# Patient Record
Sex: FEMALE
[De-identification: names, ages, dates, MRNs, and addresses within clinical notes are randomized per-mention and may not be internally consistent; named-entity substitution may affect disease eponyms.]

---

## 2017-04-04 ENCOUNTER — HOSPITAL ENCOUNTER (EMERGENCY)
Dept: HOSPITAL 31 - C.ER | Age: 52
Discharge: HOME | End: 2017-04-04
Payer: MEDICAID

## 2017-04-04 VITALS — RESPIRATION RATE: 18 BRPM | OXYGEN SATURATION: 100 %

## 2017-04-04 VITALS — HEART RATE: 68 BPM | TEMPERATURE: 98.6 F | SYSTOLIC BLOOD PRESSURE: 160 MMHG | DIASTOLIC BLOOD PRESSURE: 92 MMHG

## 2017-04-04 DIAGNOSIS — R10.12: Primary | ICD-10-CM

## 2017-04-04 LAB
ALBUMIN/GLOB SERPL: 1.3 {RATIO} (ref 1–2.1)
ALP SERPL-CCNC: 67 U/L (ref 38–126)
ALT SERPL-CCNC: 7 U/L (ref 9–52)
AST SERPL-CCNC: 22 U/L (ref 14–36)
BASOPHILS # BLD AUTO: 0.1 K/UL (ref 0–0.2)
BASOPHILS NFR BLD: 1.4 % (ref 0–2)
BILIRUB SERPL-MCNC: 0.5 MG/DL (ref 0.2–1.3)
BILIRUB UR-MCNC: NEGATIVE MG/DL
BUN SERPL-MCNC: 17 MG/DL (ref 7–17)
CALCIUM SERPL-MCNC: 9.2 MG/DL (ref 8.6–10.4)
CHLORIDE SERPL-SCNC: 100 MMOL/L (ref 98–107)
CO2 SERPL-SCNC: 23 MMOL/L (ref 22–30)
EOSINOPHIL # BLD AUTO: 0.2 K/UL (ref 0–0.7)
EOSINOPHIL NFR BLD: 3.1 % (ref 0–4)
ERYTHROCYTE [DISTWIDTH] IN BLOOD BY AUTOMATED COUNT: 14.5 % (ref 11.5–14.5)
GLOBULIN SER-MCNC: 3.4 GM/DL (ref 2.2–3.9)
GLUCOSE SERPL-MCNC: 76 MG/DL (ref 65–105)
GLUCOSE UR STRIP-MCNC: NORMAL MG/DL
HCT VFR BLD CALC: 41.4 % (ref 34–47)
KETONES UR STRIP-MCNC: NEGATIVE MG/DL
LEUKOCYTE ESTERASE UR-ACNC: (no result) LEU/UL
LYMPHOCYTES # BLD AUTO: 1.8 K/UL (ref 1–4.3)
LYMPHOCYTES NFR BLD AUTO: 24.3 % (ref 20–40)
MCH RBC QN AUTO: 26.9 PG (ref 27–31)
MCHC RBC AUTO-ENTMCNC: 32.6 G/DL (ref 33–37)
MCV RBC AUTO: 82.6 FL (ref 81–99)
MONOCYTES # BLD: 0.6 K/UL (ref 0–0.8)
MONOCYTES NFR BLD: 8 % (ref 0–10)
NRBC BLD AUTO-RTO: 0.1 % (ref 0–2)
PH UR STRIP: 5 [PH] (ref 5–8)
PLATELET # BLD: 192 K/UL (ref 130–400)
PMV BLD AUTO: 10.5 FL (ref 7.2–11.7)
POTASSIUM SERPL-SCNC: 4 MMOL/L (ref 3.6–5.2)
PROT SERPL-MCNC: 7.7 G/DL (ref 6.3–8.3)
PROT UR STRIP-MCNC: NEGATIVE MG/DL
RBC # UR STRIP: (no result) /UL
RBC #/AREA URNS HPF: 9 /HPF (ref 0–3)
SODIUM SERPL-SCNC: 139 MMOL/L (ref 132–148)
SP GR UR STRIP: 1.03 (ref 1–1.03)
UROBILINOGEN UR-MCNC: NORMAL MG/DL (ref 0.2–1)
WBC # BLD AUTO: 7.5 K/UL (ref 4.8–10.8)
WBC #/AREA URNS HPF: 1 /HPF (ref 0–5)

## 2017-04-04 PROCEDURE — 93005 ELECTROCARDIOGRAM TRACING: CPT

## 2017-04-04 PROCEDURE — 99285 EMERGENCY DEPT VISIT HI MDM: CPT

## 2017-04-04 PROCEDURE — 80053 COMPREHEN METABOLIC PANEL: CPT

## 2017-04-04 PROCEDURE — 83880 ASSAY OF NATRIURETIC PEPTIDE: CPT

## 2017-04-04 PROCEDURE — 96361 HYDRATE IV INFUSION ADD-ON: CPT

## 2017-04-04 PROCEDURE — 96374 THER/PROPH/DIAG INJ IV PUSH: CPT

## 2017-04-04 PROCEDURE — 81001 URINALYSIS AUTO W/SCOPE: CPT

## 2017-04-04 PROCEDURE — 84484 ASSAY OF TROPONIN QUANT: CPT

## 2017-04-04 PROCEDURE — 85025 COMPLETE CBC W/AUTO DIFF WBC: CPT

## 2017-04-04 PROCEDURE — 74022 RADEX COMPL AQT ABD SERIES: CPT

## 2017-04-04 NOTE — C.PDOC
History Of Present Illness


51 year old female presents to the ED with complaints of cramping epigastric 

discomfort for the past 1 day. Patient has a history of constipation and was 

seen in 2016 for the same complaints. Denies vomiting, diarrhea, fever, 

chills, or any other complaints at this time.


Time Seen by Provider: 17 17:43


Chief Complaint (Nursing): Chest Pain


History Per: Patient


History/Exam Limitations: no limitations


Onset/Duration Of Symptoms: Days


Current Symptoms Are (Timing): Still Present


Severity: Mild


Location Of Pain/Discomfort: Epigastric


Quality Of Discomfort: Cramping


Associated Symptoms: denies: Fever, Chills, Vomiting, Diarrhea





Past Medical History


Reviewed: Historical Data, Nursing Documentation, Vital Signs


Vital Signs: 


 Last Vital Signs











Temp  98.6 F   17 20:47


 


Pulse  68   17 20:47


 


Resp  18   17 20:47


 


BP  160/92 H  17 20:47


 


Pulse Ox  100   17 20:47














- Medical History


PMH: Depression (sometimes), Gastritis, HTN, Hypercholesterolemia, Migraine


Family History: States: Unknown Family Hx





- Social History


Hx Tobacco Use: No


Hx Alcohol Use: No


Hx Substance Use: No





- Immunization History


Hx Tetanus Toxoid Vaccination: Yes


Hx Influenza Vaccination: No


Hx Pneumococcal Vaccination: No





Review Of Systems


Except As Marked, All Systems Reviewed And Found Negative.


Constitutional: Negative for: Fever, Chills


Gastrointestinal: Positive for: Abdominal Pain.  Negative for: Nausea, Vomiting

, Diarrhea





Physical Exam





- Physical Exam


Appears: Non-toxic, No Acute Distress


Skin: Normal Color, Warm, Dry


Head: Atraumatic, Normacephalic


Eye(s): bilateral: Normal Inspection


Oral Mucosa: Moist


Chest: Symmetrical, No Deformity


Cardiovascular: Rhythm Regular


Respiratory: Normal Breath Sounds, No Accessory Muscle Use


Gastrointestinal/Abdominal: Soft, No Tenderness, No Distention, No Guarding, No 

Rebound, Other (+Obese abdomen)


Extremity: Normal ROM


Neurological/Psych: Oriented x3, Normal Speech, Normal Cognition





ED Course And Treatment





- Laboratory Results


Result Diagrams: 


 17 18:11





 17 18:11


Lab Interpretation: Normal (trop neg.)


Urine Pregnancy POC: Negative


ECG: Interpreted By Me


ECG Rhythm: Sinus Rhythm


ECG Interpretation: Normal


Rate From EC


O2 Sat by Pulse Oximetry: 100 (Room air)


Pulse Ox Interpretation: Normal





- Radiology


CXR: Interpreted by Me


CXR Interpretation: Yes: No Acute Disease





- Other Rad


  ** abd x 2


X-Ray: Interpreted by Me (+FOS)


Progress Note: Obstructive series, EKG, Blood work, and Urinalysis ordered and 

reviewed.  Patient treated with Aspirin, Toradol, and IV fluids.





Medical Decision Making


Medical Decision Making: 


recurrent constipation, no GB/cardiac issues today


Again diet and exercise educated.





Disposition


Doctor Will See Patient In The: Office


Counseled Patient/Family Regarding: Studies Performed, Diagnosis





- Disposition


Referrals: 


[facility] [Outside]


Disposition: HOME/ ROUTINE


Disposition Time: 20:24


Condition: GOOD


Additional Instructions: 


tavo un purgante (Citrato de Magnesio) y re-evalua garber molestia del abdomen 

despues de usar el angelina 2-3 veces


Sigue un esuavesante de los heces diario


Come mas verduras crudas


Tavo mas agua.l


Sigue en la [facility] necessario. 


Instructions:  Constipation (ED)


Print Language: Albanian





- Clinical Impression


Clinical Impression: 


 Colicky LUQ abdominal pain





- Scribe Statement


The provider has reviewed the documentation as recorded by the Scribe


Kamron Vazquez.





Provider Attestation: 





All medical record entries made by the Scribe were at my direction and 

personally dictated by me. I have reviewed the chart and agree that the record 

accurately reflects my personal performance of the history, physical exam, 

medical decision making, and the department course for this patient. I have 

also personally directed, reviewed, and agree with the discharge instructions 

and disposition.

## 2017-04-05 NOTE — RAD
PROCEDURE:  Radiographs of the chest and abdomen (obstructive series)



HISTORY:

epigastric pain  



COMPARISON:

No prior.



TECHNIQUE:

AP radiograph of the chest, with upright and supine radiographs of 

the abdomen.



FINDINGS:



CHEST:

Lungs: Prominent lung markings. No focal infiltrate or consolidation.



Cardiovascular: Normal size heart. No pulmonary vascular congestion.



Pleura: No pleural fluid. No pneumothorax.



Other findings: None.



ABDOMEN AND PELVIS:

Bowel: Mild-to-moderate constipation.  Otherwise unremarkable bowel 

gas pattern. . No evidence of mechanical obstruction.



Free air: None.



Bones:  Unremarkable.



Other findings: None.



IMPRESSION:

Mild-to-moderate constipation. No radiographic evidence of SBO.



Slightly prominent lung markings.  No evidence of subdiaphragmatic 

free air.

## 2017-04-05 NOTE — CARD
--------------- APPROVED REPORT --------------





EKG Measurement

Heart [id number]

MT 152P56

[id number]

[id number]

[id number]



<Conclusion>

Normal sinus rhythm

Normal ECG

## 2017-07-27 ENCOUNTER — HOSPITAL ENCOUNTER (OUTPATIENT)
Dept: HOSPITAL 31 - C.ER | Age: 52
Setting detail: OBSERVATION
LOS: 1 days | Discharge: HOME | End: 2017-07-28
Attending: INTERNAL MEDICINE | Admitting: INTERNAL MEDICINE
Payer: MEDICAID

## 2017-07-27 DIAGNOSIS — I45.10: ICD-10-CM

## 2017-07-27 DIAGNOSIS — R07.9: Primary | ICD-10-CM

## 2017-07-27 DIAGNOSIS — E78.5: ICD-10-CM

## 2017-07-27 DIAGNOSIS — Z87.891: ICD-10-CM

## 2017-07-27 DIAGNOSIS — I10: ICD-10-CM

## 2017-07-27 DIAGNOSIS — K59.00: ICD-10-CM

## 2017-07-27 LAB
ALBUMIN/GLOB SERPL: 1.1 {RATIO} (ref 1–2.1)
ALP SERPL-CCNC: 82 U/L (ref 38–126)
ALT SERPL-CCNC: 19 U/L (ref 9–52)
AST SERPL-CCNC: 18 U/L (ref 14–36)
BASOPHILS # BLD AUTO: 0.1 K/UL (ref 0–0.2)
BASOPHILS NFR BLD: 1.1 % (ref 0–2)
BILIRUB SERPL-MCNC: 0.7 MG/DL (ref 0.2–1.3)
BUN SERPL-MCNC: 11 MG/DL (ref 7–17)
CALCIUM SERPL-MCNC: 8.4 MG/DL (ref 8.6–10.4)
CHLORIDE SERPL-SCNC: 101 MMOL/L (ref 98–107)
CO2 SERPL-SCNC: 26 MMOL/L (ref 22–30)
EOSINOPHIL # BLD AUTO: 0.2 K/UL (ref 0–0.7)
EOSINOPHIL NFR BLD: 2.5 % (ref 0–4)
ERYTHROCYTE [DISTWIDTH] IN BLOOD BY AUTOMATED COUNT: 13.3 % (ref 11.5–14.5)
GLOBULIN SER-MCNC: 3.2 GM/DL (ref 2.2–3.9)
GLUCOSE SERPL-MCNC: 88 MG/DL (ref 65–105)
HCT VFR BLD CALC: 38.1 % (ref 34–47)
LYMPHOCYTES # BLD AUTO: 1.7 K/UL (ref 1–4.3)
LYMPHOCYTES NFR BLD AUTO: 27.1 % (ref 20–40)
MCH RBC QN AUTO: 26.2 PG (ref 27–31)
MCHC RBC AUTO-ENTMCNC: 32 G/DL (ref 33–37)
MCV RBC AUTO: 82.1 FL (ref 81–99)
MONOCYTES # BLD: 0.5 K/UL (ref 0–0.8)
MONOCYTES NFR BLD: 7.2 % (ref 0–10)
NRBC BLD AUTO-RTO: 0.1 % (ref 0–2)
PLATELET # BLD: 181 K/UL (ref 130–400)
PMV BLD AUTO: 10.6 FL (ref 7.2–11.7)
POTASSIUM SERPL-SCNC: 4.2 MMOL/L (ref 3.6–5.2)
PROT SERPL-MCNC: 6.8 G/DL (ref 6.3–8.3)
SODIUM SERPL-SCNC: 139 MMOL/L (ref 132–148)
WBC # BLD AUTO: 6.3 K/UL (ref 4.8–10.8)

## 2017-07-27 PROCEDURE — 80053 COMPREHEN METABOLIC PANEL: CPT

## 2017-07-27 PROCEDURE — 84443 ASSAY THYROID STIM HORMONE: CPT

## 2017-07-27 PROCEDURE — 36415 COLL VENOUS BLD VENIPUNCTURE: CPT

## 2017-07-27 PROCEDURE — 71010: CPT

## 2017-07-27 PROCEDURE — 83036 HEMOGLOBIN GLYCOSYLATED A1C: CPT

## 2017-07-27 PROCEDURE — 85025 COMPLETE CBC W/AUTO DIFF WBC: CPT

## 2017-07-27 PROCEDURE — 96372 THER/PROPH/DIAG INJ SC/IM: CPT

## 2017-07-27 PROCEDURE — 84484 ASSAY OF TROPONIN QUANT: CPT

## 2017-07-27 PROCEDURE — 99285 EMERGENCY DEPT VISIT HI MDM: CPT

## 2017-07-27 PROCEDURE — 80061 LIPID PANEL: CPT

## 2017-07-27 RX ADMIN — ENOXAPARIN SODIUM SCH MG: 40 INJECTION SUBCUTANEOUS at 18:30

## 2017-07-27 RX ADMIN — PANTOPRAZOLE SODIUM SCH MG: 40 TABLET, DELAYED RELEASE ORAL at 18:30

## 2017-07-27 NOTE — CP.PCM.PN
Subjective





- Date & Time of Evaluation


Date of Evaluation: 17


Time of Evaluation: 16:45





- Subjective


Subjective: 





PGY3 Medicine Note - Dr. Zamora's service:


Patient seen and examined at bedside in ED.  Patient is a 51 year old female 

with PMHx of hyperlipidemia, hypertension, migraines, gastritis and a stress 

ulcer.  Patient denies having hypertension and hyperlipidemia but she has had 

it on prior ER visits.  Patient is presenting for chest pain this morning.  

Patient says she was cooking and started feeling very hot around 11am.  Then 

she got dizzy and began experiencing substernal, sudden, sharp chest pain.  The 

pain did not radiate.  It was associated with nausea, shortness of breath and 

diaphoresis.  Patient says this is the first time she has ever had chest pain 

however she was admitted in  for chest pain.  Patient says the pain is gone 

now but it lasted for about 3 hours.  Patient reports headache now associated 

with right neck pain.  Patient has had headaches like this before.  Patient 

says it started 3 hours ago.  Patient also reports intermittent constipation.  

Patient denies fever, chills, cough, vomiting, diarrhea, dysuria.





PMHx: as above:


PSHx: C section


Family Hx: patient says he brother  suddenly at 11 years old from a heart 

problem


Social Hx: quit smoking 3 years ago before which she smoked 5 cigarettes a day, 

denies ETOH, denies illicit drug use


Allergies: NKDA





Objective





- Vital Signs/Intake and Output


Vital Signs (last 24 hours): 


 











Temp Pulse Resp BP Pulse Ox


 


 98 F   55 L  14   148/67   100 


 


 17 14:12  17 16:52  17 16:52  17 16:52  17 16:52











- Labs


Labs: 


 





 17 15:07 





 17 15:07 











- Constitutional


Appears: Non-toxic, No Acute Distress





- Head Exam


Head Exam: NORMAL INSPECTION





- Eye Exam


Eye Exam: EOMI





- ENT Exam


ENT Exam: Mucous Membranes Moist





- Respiratory Exam


Respiratory Exam: Decreased Breath Sounds, Clear to Ausculation Bilateral, 

NORMAL BREATHING PATTERN.  absent: Rales, Rhonchi, Wheezes





- Cardiovascular Exam


Cardiovascular Exam: REGULAR RHYTHM, +S1, +S2.  absent: Gallop, Rubs, Murmur





- GI/Abdominal Exam


GI & Abdominal Exam: Soft, Normal Bowel Sounds.  absent: Distended, Firm, 

Tenderness





- Extremities Exam


Extremities Exam: absent: Pedal Edema, Tenderness





- Neurological Exam


Neurological Exam: Alert, Awake, Oriented x3





- Psychiatric Exam


Psychiatric exam: Normal Affect, Normal Mood





- Skin


Skin Exam: Normal Color, Warm





Assessment and Plan





- Assessment and Plan (Free Text)


Assessment: 





Chest Pain


MAXIMILIANO negative x1


EKG - bradycardic at 52bpm with incomplete RBBB


CXR - no consolidation, pleural effusion or definite pneumothorax (please see 

full report)


F/U ROMIs with EKGs x2 at 21:30 and 4am


F/U HgbA1C, TSH, Lipid panel


ASA 81mg PO daily


Crestor 10mg PO HS


Nitro SL Q5M





Headache


Tylenol 650mg PO Q6H PRN headache





HTN


Started lisinopril 2.5mg PO daily





Prophylaxis


Protonix 40mg PO daily


Lovenox 40mg SC daily





All management per Dr. Zamora

## 2017-07-27 NOTE — C.PDOC
History Of Present Illness





52 y/o female, with no significant past medical history, presents to emergency 

department via BLS with complaint of sudden onset mid-sternal chest discomfort, 

described as sharp, which eased up and then lingered, lasting about 2 hours. 

Patient states she began to feel short of breath and sweaty, causing concern, 

and prompting EMS call. Patient states she has never had similar symptoms in 

the past. She reports chest pain is now resolved, but now complains of 

headache. Denies family cardiac history or smoking. Otherwise, denies fever, 

chills, nausea, vomiting, or other associated symptoms.


Time Seen by Provider: 07/27/17 14:22


Chief Complaint (Nursing): Chest Pain


History Per: Patient


History/Exam Limitations: no limitations


Onset/Duration Of Symptoms: Hrs


Current Symptoms Are (Timing): Gone


Quality: "Pain"


Associated Symptoms: Dyspnea, Diaphoresis


Recent travel outside of the [place]: No





Past Medical History


Reviewed: Historical Data, Nursing Documentation, Vital Signs


Vital Signs: 


 Last Vital Signs











Temp  98 F   07/27/17 14:12


 


Pulse  55 L  07/27/17 16:52


 


Resp  14   07/27/17 16:52


 


BP  148/67   07/27/17 16:52


 


Pulse Ox  100   07/27/17 16:52














- Medical History


PMH: Depression (sometimes), Gastritis, HTN, Hypercholesterolemia, Migraine


Family History: States: Unknown Family Hx





- Social History


Hx Tobacco Use: No


Hx Alcohol Use: No


Hx Substance Use: No





- Immunization History


Hx Tetanus Toxoid Vaccination: Yes


Hx Influenza Vaccination: No


Hx Pneumococcal Vaccination: No





Review Of Systems


Except As Marked, All Systems Reviewed And Found Negative.


Constitutional: Positive for: Sweats.  Negative for: Fever, Chills


Cardiovascular: Positive for: Chest Pain.  Negative for: Palpitations


Respiratory: Positive for: Shortness of Breath.  Negative for: Cough


Gastrointestinal: Negative for: Nausea, Vomiting


Skin: Negative for: Rash


Neurological: Positive for: Headache.  Negative for: Dizziness





Physical Exam





- Physical Exam


Appears: Non-toxic, No Acute Distress


Skin: Warm, Dry


Head: Atraumatic, Normacephalic


Oral Mucosa: Moist


Chest: Symmetrical


Cardiovascular: Rhythm Regular


Respiratory: Normal Breath Sounds, No Rales, No Rhonchi, No Wheezing


Gastrointestinal/Abdominal: Soft, No Tenderness, No Guarding, No Rebound


Back: Normal Inspection


Extremity: Normal ROM, Capillary Refill (< 2 sec.  )


Neurological/Psych: Oriented x3, Normal Speech, Normal Cognition





ED Course And Treatment





- Laboratory Results


Result Diagrams: 


 07/27/17 15:07





 07/27/17 15:07


Lab Interpretation: No Acute Changes


ECG: Interpreted By Me


ECG Rhythm: Sinus Bradycardia, R BBB (incomplete)


ECG Interpretation: No Acute Changes


O2 Sat by Pulse Oximetry: 99 (RA)


Pulse Ox Interpretation: Normal





- Radiology


CXR: Viewed By Me, Read By Radiologist


CXR Interpretation: Yes: No Acute Disease


Progress Note: EKG, CxR, bloodwork ordered.


Reevaluation Time: 16:20


Reassessment Condition: Improved





- Physician Consult Information


Time Consulting Physician Contacted: 16:25


Physician Contacted: Shell Zamora


Outcome Of Conversation: Patient to be kept for cardiac observation.





Disposition





- Disposition


Disposition: HOSPITALIZED


Disposition Time: 16:26


Condition: STABLE





- POA


Present On Arrival: None





- Clinical Impression


Clinical Impression: 


 Chest pain








- Scribe Statement


The provider has reviewed the documentation as recorded by the Yanyibmalka Duff





All medical record entries made by the Jeremi were at my direction and 

personally dictated by me. I have reviewed the chart and agree that the record 

accurately reflects my personal performance of the history, physical exam, 

medical decision making, and the department course for this patient. I have 

also personally directed, reviewed, and agree with the discharge instructions 

and disposition.

## 2017-07-27 NOTE — RAD
HISTORY:

chest pain  



COMPARISON:

Obstructive series performed 4/4/17 



TECHNIQUE:

Chest, one view.



FINDINGS:

Examination limited by habitus.



LUNGS:

No focal consolidation.



Please note that chest x-ray has limited sensitivity for the 

detection of pulmonary masses.



PLEURA:

No significant pleural effusion identified. No definite pneumothorax .



CARDIOVASCULAR:

Heart size appears within normal limits.  Faint atherosclerotic 

calcifications of the aorta.



OSSEOUS STRUCTURES:

 Degenerative changes of the spine.



VISUALIZED UPPER ABDOMEN:

Unremarkable.



OTHER FINDINGS:

None.



IMPRESSION:

No focal consolidation, significant pleural effusion, or definite 

pneumothorax identified.

## 2017-07-28 VITALS
DIASTOLIC BLOOD PRESSURE: 64 MMHG | HEART RATE: 67 BPM | OXYGEN SATURATION: 98 % | SYSTOLIC BLOOD PRESSURE: 109 MMHG | TEMPERATURE: 98.3 F

## 2017-07-28 VITALS — RESPIRATION RATE: 20 BRPM

## 2017-07-28 LAB
ALBUMIN/GLOB SERPL: 1.1 {RATIO} (ref 1–2.1)
ALP SERPL-CCNC: 72 U/L (ref 38–126)
ALT SERPL-CCNC: 18 U/L (ref 9–52)
AST SERPL-CCNC: 21 U/L (ref 14–36)
BASOPHILS # BLD AUTO: 0.1 K/UL (ref 0–0.2)
BASOPHILS NFR BLD: 1.2 % (ref 0–2)
BILIRUB SERPL-MCNC: 0.8 MG/DL (ref 0.2–1.3)
BUN SERPL-MCNC: 12 MG/DL (ref 7–17)
CALCIUM SERPL-MCNC: 8.8 MG/DL (ref 8.6–10.4)
CHLORIDE SERPL-SCNC: 102 MMOL/L (ref 98–107)
CHOLEST SERPL-MCNC: 187 MG/DL (ref 0–199)
CO2 SERPL-SCNC: 25 MMOL/L (ref 22–30)
EOSINOPHIL # BLD AUTO: 0.2 K/UL (ref 0–0.7)
EOSINOPHIL NFR BLD: 3.6 % (ref 0–4)
ERYTHROCYTE [DISTWIDTH] IN BLOOD BY AUTOMATED COUNT: 13.4 % (ref 11.5–14.5)
GLOBULIN SER-MCNC: 3 GM/DL (ref 2.2–3.9)
GLUCOSE SERPL-MCNC: 93 MG/DL (ref 65–105)
HCT VFR BLD CALC: 39.4 % (ref 34–47)
LYMPHOCYTES # BLD AUTO: 2 K/UL (ref 1–4.3)
LYMPHOCYTES NFR BLD AUTO: 39.8 % (ref 20–40)
MCH RBC QN AUTO: 26.5 PG (ref 27–31)
MCHC RBC AUTO-ENTMCNC: 32.1 G/DL (ref 33–37)
MCV RBC AUTO: 82.6 FL (ref 81–99)
MONOCYTES # BLD: 0.4 K/UL (ref 0–0.8)
MONOCYTES NFR BLD: 7.3 % (ref 0–10)
NRBC BLD AUTO-RTO: 0.1 % (ref 0–2)
PLATELET # BLD: 161 K/UL (ref 130–400)
PMV BLD AUTO: 10.6 FL (ref 7.2–11.7)
POTASSIUM SERPL-SCNC: 3.8 MMOL/L (ref 3.6–5.2)
PROT SERPL-MCNC: 6.3 G/DL (ref 6.3–8.3)
SODIUM SERPL-SCNC: 138 MMOL/L (ref 132–148)
TSH SERPL-ACNC: 2.69 MIU/L (ref 0.46–4.68)
WBC # BLD AUTO: 5.1 K/UL (ref 4.8–10.8)

## 2017-07-28 RX ADMIN — ENOXAPARIN SODIUM SCH MG: 40 INJECTION SUBCUTANEOUS at 10:33

## 2017-07-28 RX ADMIN — PANTOPRAZOLE SODIUM SCH MG: 40 TABLET, DELAYED RELEASE ORAL at 10:32

## 2017-07-28 NOTE — CP.PCM.PN
Subjective





- Date & Time of Evaluation


Date of Evaluation: 07/28/17


Time of Evaluation: 08:00





- Subjective


Subjective: 





Medicine Progress Note- Noah's service


Patient seen and examined.  Patient states that she feels much better today and 

that her chest pain has resolved. She wishes to go home today.  Patient denies 

fever, chills, nausea, vomiting, diaphoresis, chest pain, shortness of breath, 

cough, leg swelling, dizziness and headache. 





Objective





- Vital Signs/Intake and Output


Vital Signs (last 24 hours): 


 











Temp Pulse Resp BP Pulse Ox


 


 97.8 F   64   20   147/84   99 


 


 07/28/17 07:24  07/28/17 10:31  07/28/17 07:24  07/28/17 10:31  07/28/17 07:24








Intake and Output: 


 











 07/28/17 07/28/17





 06:59 18:59


 


Intake Total 130 


 


Balance 130 














- Medications


Medications: 


 Current Medications





Acetaminophen (Tylenol 325mg Tab)  650 mg PO Q6 PRN


   PRN Reason: Headache


   Last Admin: 07/27/17 21:45 Dose:  650 mg


Aspirin (Ecotrin)  81 mg PO DAILY [facility]


   Last Admin: 07/28/17 10:33 Dose:  81 mg


Enoxaparin Sodium (Lovenox)  40 mg SC DAILY [facility]


   Last Admin: 07/28/17 10:33 Dose:  40 mg


Lisinopril (Zestril)  2.5 mg PO DAILY [facility]


   Last Admin: 07/28/17 10:33 Dose:  2.5 mg


Nitroglycerin (Nitrostat Sl Tab)  0.4 mg SL Q5M PRN


   PRN Reason: chest pain


Pantoprazole Sodium (Protonix Ec Tab)  40 mg PO DAILY [facility]


   Last Admin: 07/28/17 10:32 Dose:  40 mg


Rosuvastatin Calcium (Crestor)  10 mg PO HS [facility]


   Last Admin: 07/27/17 21:46 Dose:  10 mg











- Labs


Labs: 


 





 07/28/17 04:36 





 07/28/17 04:36 











- Constitutional


Appears: Non-toxic, No Acute Distress





- Head Exam


Head Exam: ATRAUMATIC, NORMOCEPHALIC





- Eye Exam


Eye Exam: EOMI, Normal appearance


Pupil Exam: NORMAL ACCOMODATION





- ENT Exam


ENT Exam: Mucous Membranes Moist, Normal Exam





- Neck Exam


Neck Exam: Full ROM, Normal Inspection





- Respiratory Exam


Respiratory Exam: Clear to Ausculation Bilateral, NORMAL BREATHING PATTERN.  

absent: Rales, Rhonchi, Wheezes, Respiratory Distress





- Cardiovascular Exam


Cardiovascular Exam: REGULAR RHYTHM, +S1, +S2.  absent: Murmur





- GI/Abdominal Exam


GI & Abdominal Exam: Soft, Normal Bowel Sounds.  absent: Firm, Guarding, Rigid, 

Tenderness





- Extremities Exam


Extremities Exam: Normal Inspection





- Back Exam


Back Exam: NORMAL INSPECTION





- Neurological Exam


Neurological Exam: Alert, Awake, CN II-XII Intact, Normal Gait, Oriented x3





- Psychiatric Exam


Psychiatric exam: Normal Affect, Normal Mood





- Skin


Skin Exam: Dry, Intact, Normal Color, Warm





Assessment and Plan





- Assessment and Plan (Free Text)


Assessment: 





Chest Pain


MAXIMILIANO negative x3


EKG - bradycardic at 52bpm with incomplete RBBB


CXR - no consolidation, pleural effusion or definite pneumothorax (please see 

full report)


No acute ST changes on EKG


HgbA1C 5.1


TSH nml 


Lipid panel- Cholesterol 187, , HDL 62, TG 86


ASA 81mg PO daily


Crestor 10mg PO HS


Nitro SL Q5M


Chest pain non-cardiac in origin





Headache


Resolved


Tylenol 650mg PO Q6H PRN headache





HTN


Started lisinopril 2.5mg PO daily


Will discharge on lisinopril 5mg daily 





Prophylaxis


Protonix 40mg PO daily


Lovenox 40mg SC daily





All management per Dr. Zamora

## 2017-07-30 NOTE — HP
HISTORY OF PRESENT ILLNESS:  A 51-year-old female had _____ chest pain and

she came to the hospital for admission.



PHYSICAL EXAMINATION:

GENERAL:  The patient is awake, alert and oriented

VITAL SIGNS:  Temperature 98, pulse 90.

HEENT:  Within normal limits.

NECK:  Supple.

CHEST:  Symmetrical.

HEART:  Regular.

ABDOMEN:  Soft.

EXTREMITIES:  No edema.



IMPRESSION:  She is advised supportive care.  _____ 07/27/2017.





__________________________________________

Shell Zamora MD



DD:  07/29/2017 19:30:22

DT:  07/30/2017 2:31:24

Job # [id number]

## 2017-08-04 NOTE — CARD
--------------- APPROVED REPORT --------------





EKG Measurement

Heart [id number]

ME 170P51

[id number]

[id number]

[id number]



<Conclusion>

Sinus bradycardia

Incomplete right bundle branch block

Borderline ECG

## 2017-08-07 NOTE — CARD
--------------- APPROVED REPORT --------------





EKG Measurement

Heart [id number]

DE 154P68

[id number]

[id number]

[id number]



<Conclusion>

Sinus bradycardia

Low voltage QRS

Incomplete right bundle branch block

Borderline ECG

## 2017-08-07 NOTE — CARD
--------------- APPROVED REPORT --------------





EKG Measurement

Heart [id number]

HI 152P60

[id number]

[id number]

[id number]



<Conclusion>

Sinus bradycardia

Incomplete right bundle branch block

Borderline ECG

## 2017-12-21 ENCOUNTER — HOSPITAL ENCOUNTER (EMERGENCY)
Dept: HOSPITAL 14 - H.ER | Age: 52
Discharge: HOME | End: 2017-12-21
Payer: MEDICAID

## 2017-12-21 VITALS
OXYGEN SATURATION: 99 % | TEMPERATURE: 98.2 F | DIASTOLIC BLOOD PRESSURE: 85 MMHG | HEART RATE: 70 BPM | RESPIRATION RATE: 16 BRPM | SYSTOLIC BLOOD PRESSURE: 151 MMHG

## 2017-12-21 DIAGNOSIS — Z79.82: ICD-10-CM

## 2017-12-21 DIAGNOSIS — F43.20: Primary | ICD-10-CM

## 2017-12-21 DIAGNOSIS — I10: ICD-10-CM

## 2017-12-21 DIAGNOSIS — E78.00: ICD-10-CM

## 2017-12-21 DIAGNOSIS — F32.9: ICD-10-CM

## 2017-12-21 LAB
BASOPHILS # BLD AUTO: 0.1 K/UL (ref 0–0.2)
BASOPHILS NFR BLD: 1 % (ref 0–2)
BILIRUB UR-MCNC: NEGATIVE MG/DL
BUN SERPL-MCNC: 16 MG/DL (ref 7–17)
CALCIUM SERPL-MCNC: 9.6 MG/DL (ref 8.4–10.2)
CHLORIDE SERPL-SCNC: 101 MMOL/L (ref 98–107)
CO2 SERPL-SCNC: 24 MMOL/L (ref 22–30)
COLOR UR: YELLOW
EOSINOPHIL # BLD AUTO: 0.1 K/UL (ref 0–0.7)
EOSINOPHIL NFR BLD: 2.2 % (ref 0–4)
ERYTHROCYTE [DISTWIDTH] IN BLOOD BY AUTOMATED COUNT: 13.9 % (ref 11.5–14.5)
ETHANOL SERPL-MCNC: < 10 MG/DL (ref 0–10)
GLUCOSE SERPL-MCNC: 99 MG/DL (ref 65–105)
GLUCOSE UR STRIP-MCNC: (no result) MG/DL
HCT VFR BLD CALC: 38.3 % (ref 34–47)
KETONES UR STRIP-MCNC: NEGATIVE MG/DL
LEUKOCYTE ESTERASE UR-ACNC: (no result) LEU/UL
LYMPHOCYTES # BLD AUTO: 1.9 K/UL (ref 1–4.3)
LYMPHOCYTES NFR BLD AUTO: 31.3 % (ref 20–40)
MCH RBC QN AUTO: 26.5 PG (ref 27–31)
MCHC RBC AUTO-ENTMCNC: 32.1 G/DL (ref 33–37)
MCV RBC AUTO: 82.8 FL (ref 81–99)
MONOCYTES # BLD: 0.5 K/UL (ref 0–0.8)
MONOCYTES NFR BLD: 7.7 % (ref 0–10)
NEUTROPHILS # BLD: 3.4 K/UL (ref 1.8–7)
NEUTROPHILS NFR BLD AUTO: 57.8 % (ref 50–75)
NRBC BLD AUTO-RTO: 0.1 % (ref 0–0)
PH UR STRIP: 6 [PH] (ref 5–8)
PLATELET # BLD: 181 K/UL (ref 130–400)
PMV BLD AUTO: 11.1 FL (ref 7.2–11.7)
POTASSIUM SERPL-SCNC: 3.5 MMOL/L (ref 3.6–5)
PROT UR STRIP-MCNC: 30 MG/DL
RBC # UR STRIP: NEGATIVE /UL
RBC #/AREA URNS HPF: 2 /HPF (ref 0–3)
SODIUM SERPL-SCNC: 136 MMOL/L (ref 132–148)
SP GR UR STRIP: 1.02 (ref 1–1.03)
UROBILINOGEN UR-MCNC: (no result) MG/DL (ref 0.2–1)
WBC # BLD AUTO: 5.9 K/UL (ref 4.8–10.8)
WBC #/AREA URNS HPF: 1 /HPF (ref 0–5)

## 2017-12-21 NOTE — ED PDOC
HPI: Psych/Substance Abuse


Time Seen by Provider: 17 16:34


Chief Complaint (Nursing): Psychiatric Evaluation


Chief Complaint (Provider): Psychiatric Evaluation


History Per: Patient, EMS


History/Exam Limitations: no limitations


Onset/Duration Of Symptoms: Mins (prior to arrival)


Current Symptoms Are (Timing): Still Present


Additional Complaint(s): 


Ariadna Ellison is a 52 year old female with a past medical history of 

depression who was brought to the ED by EMS for psychiatric evaluation. Patient 

was at home when EMS services were contacted. Patient denies taking any 

medication for depression. Patient states she was thinking of jumping off the 5

th floor of a building to kill herself. Patient denies any other medical 

complaints at this time. 





PMD: Non-[facility] Provider








Past Medical History


Reviewed: Historical Data, Nursing Documentation, Vital Signs


Vital Signs: 





 Last Vital Signs











Temp  98.2 F   17 16:14


 


Pulse  70   17 16:14


 


Resp  16   17 16:14


 


BP  151/85 H  17 16:14


 


Pulse Ox  99   17 16:14














- Medical History


PMH: Depression (sometimes), Gastritis, HTN, Hypercholesterolemia, Migraine


   Denies: HIV, Chronic Kidney Disease





- Surgical History


Surgical History: 





- Family History


Family History: States: Unknown Family Hx





- Social History


Current smoker - smoking cessation education provided: No


Alcohol: None


Drugs: Denies





- Immunization History


Hx Tetanus Toxoid Vaccination: Yes


Hx Influenza Vaccination: No


Hx Pneumococcal Vaccination: No





- Home Medications


Home Medications: 


 Ambulatory Orders











 Medication  Instructions  Recorded


 


Aspirin [Aspirin Chewable] 1 tab PO DAILY 17


 


Lisinopril [Prinivil] 5 mg PO DAILY #30 tablet 17














- Allergies


Allergies/Adverse Reactions: 


 Allergies











Allergy/AdvReac Type Severity Reaction Status Date / Time


 


No Known Allergies Allergy   Verified 17 14:12














Review of Systems


ROS Statement: Except As Marked, All Systems Reviewed And Found Negative


Psych: Positive for: Depression, Suicidal ideation





Physical Exam





- Reviewed


Nursing Documentation Reviewed: Yes


Vital Signs Reviewed: Yes





- Physical Exam


Appears: Positive for: Non-toxic, No Acute Distress


Head Exam: Positive for: ATRAUMATIC, NORMAL INSPECTION, NORMOCEPHALIC


Skin: Positive for: Normal Color, Warm, Dry


Neck: Positive for: Normal, Painless ROM, Supple


Cardiovascular/Chest: Positive for: Regular Rate, Rhythm.  Negative for: Murmur


Respiratory: Positive for: Normal Breath Sounds.  Negative for: Respiratory 

Distress


Gastrointestinal/Abdominal: Positive for: Normal Exam, Bowel Sounds, Soft.  

Negative for: Tenderness


Back: Positive for: Normal Inspection.  Negative for: L CVA Tenderness, R CVA 

Tenderness, Vertebral Tenderness


Extremity: Positive for: Normal ROM.  Negative for: Pedal Edema, Deformity


Neurologic/Psych: Positive for: Alert, Oriented, Mood/Affect (Tearful).  

Negative for: Motor/Sensory Deficits





- Laboratory Results


Result Diagrams: 


 17 17:30





 17 17:30





- ECG


O2 Sat by Pulse Oximetry: 99 (RA)


Pulse Ox Interpretation: Normal





Medical Decision Making


Medical Decision Making: 


Time: 16:59


Initial Impression: Psychiatric evaluation


--EKG


--Acetaminophen


--Alcohol serum


--BMP


--Drug screen, urine


--Salicylate


--CBC w/ differential


--Urinalysis 


--Reevaluation


   


Time: 18:30


--Patient is medically cleared for crisis and crisis is aware. 





Time: 19:00


--Patient is signed over to Dr. Sandoval pending crisis eval





--------------------------------------------------------------------------------

-----------------


Scribe Attestation:


Documented by Sam Johns acting as a scribe for Amparo Masters MD.


   


MD Scribe Attestation:


All medical record entries made by the Scribe were at my direction and 

personally dictated by me. I have reviewed the chart and agree that the record 

accurately reflects my personal performance of the history, physical exam, 

medical decision making, and the department course for this patient. I have 

also personally directed, reviewed, and agree with the discharge instructions 

and disposition.








Disposition





- Clinical Impression


Clinical Impression: 


 Adjustment disorder








- Patient ED Disposition


Is Patient to be Admitted: Transfer of Care





- Disposition


Disposition: Transfer of Care


Disposition Time: 19:00


Condition: STABLE


Instructions:  Mood Disorders (ED), Suicide Prevention for Adults (ED)


Forms:  [facility] (English)


Print Language: Irish


Patient Signed Over To: Ervin Sandoval


Handoff Comments: pending crisis evalulation

## 2017-12-21 NOTE — ED PDOC
- Laboratory Results


Result Diagrams: 


 12/21/17 17:30





 12/21/17 17:30





- ECG


O2 Sat by Pulse Oximetry: 99 (RA)


Pulse Ox Interpretation: Normal





- Progress


ED Course And Treament: 





0700


Pt. signed out to my by Dr. Masters pending crisis eval





725


Pt. cleared by crisis by Dr. Hu with dx: adjustment disorder.





Disposition





- Clinical Impression


Clinical Impression: 


 Adjustment disorder








- POA


Present On Arrival: None





- Disposition


Disposition: Routine/Home


Disposition Time: 19:29


Condition: STABLE


Instructions:  Mood Disorders (ED), Suicide Prevention for Adults (ED)


Forms:  [facility] (English)

## 2017-12-22 NOTE — CARD
--------------- APPROVED REPORT --------------





EKG Measurement

Heart [id number]

NC 158P60

[id number]

[id number]

[id number]



<Conclusion>

Sinus bradycardia

Possible Left atrial enlargement

Borderline ECG

## 2018-10-27 ENCOUNTER — HOSPITAL ENCOUNTER (EMERGENCY)
Dept: HOSPITAL 31 - C.ER | Age: 53
LOS: 1 days | Discharge: HOME | End: 2018-10-28
Payer: MEDICAID

## 2018-10-27 DIAGNOSIS — R07.89: Primary | ICD-10-CM

## 2018-10-27 DIAGNOSIS — N39.0: ICD-10-CM

## 2018-10-27 NOTE — C.PDOC
History Of Present Illness


53 year old female presents to the emergency department with complaints of chest

discomfort since 9PM tonight. Patient complains of left arm pain, but denies 

fever, chills, nausea, vomiting, slurred speech, and visual changes. 


Time Seen by Provider: 10/27/18 23:41


Chief Complaint (Nursing): Chest Pain


History Per: Patient


History/Exam Limitations: no limitations


Onset/Duration Of Symptoms: Hrs


Current Symptoms Are (Timing): Still Present


Quality: "Pain"





Past Medical History


Reviewed: Historical Data, Nursing Documentation, Vital Signs


Vital Signs: 





                                Last Vital Signs











Temp  98.9 F   10/27/18 23:29


 


Pulse  78   10/27/18 23:29


 


Resp  18   10/27/18 23:29


 


BP  134/57 L  10/27/18 23:29


 


Pulse Ox  99   10/27/18 23:29














- Medical History


PMH: Depression (sometimes), Gastritis, HTN, Hypercholesterolemia, Migraine


   Denies: Diabetes, Hepatitis, HIV, Chronic Kidney Disease, Seizures, Sexually 

Transmitted Disease


Surgical History: 


Family History: States: No Known Family Hx





- Social History


Hx Tobacco Use: No


Hx Alcohol Use: Yes (social)


Hx Substance Use: No





- Immunization History


Hx Tetanus Toxoid Vaccination: Yes


Hx Influenza Vaccination: No


Hx Pneumococcal Vaccination: No





Review Of Systems


Constitutional: Negative for: Fever, Chills


Eyes: Negative for: Vision Change


Cardiovascular: Positive for: Chest Pain


Gastrointestinal: Negative for: Nausea, Vomiting


Musculoskeletal: Positive for: Arm Pain (left)


Neurological: Negative for: Change in Speech





Physical Exam





- Physical Exam


Appears: Non-toxic, No Acute Distress


Skin: Warm, Dry


Head: Normacephalic


Eye(s): bilateral: Normal Inspection, PERRL, EOMI


Oral Mucosa: Moist


Neck: Trachea Midline, Supple


Chest: Symmetrical, No Tenderness


Cardiovascular: Rhythm Regular, No Murmur


Respiratory: No Rales, No Rhonchi, No Wheezing


Gastrointestinal/Abdominal: Soft, No Tenderness, No Guarding, No Rebound


Extremity: Normal ROM (all extremities)


Neurological/Psych: Oriented x3





ED Course And Treatment





- Laboratory Results


Result Diagrams: 


                                 10/28/18 00:17





                                 10/28/18 00:17


ECG: Interpreted By Me, Viewed By Me


ECG Rhythm: Sinus Rhythm, Nonspecific Changes


O2 Sat by Pulse Oximetry: 99 (RA)


Pulse Ox Interpretation: Normal





- Radiology


CXR: Interpreted by Me, Viewed By Me


CXR Interpretation: No: Infiltrates, Fracture, Pnemothorax


Progress Note: Plan:  EKG.  Chemistry.  Bloodwork.  CXR.  Aspirin 325mg PO.  HCG

Qualitative Urine.  Urinalysis


Reevaluation Time: 01:12


Reassessment Condition: Improved





Disposition


Counseled Patient/Family Regarding: Studies Performed, Diagnosis, Need For 

Followup





- Disposition


Referrals: 


Prachi Anand MD [Primary Care Provider] - 


Disposition: HOME/ ROUTINE


Disposition Time: 23:41


Condition: FAIR


Additional Instructions: 


Please return if symptoms recur


Prescriptions: 


Nitrofurantoin Macrocrystals [Macrobid] 1 cap PO BID #14 cap


Instructions:  Chest Pain (DC), Urinary Tract Infection, Adult (DC)


Forms:  [employer] (English)


Print Language: Yoruba





- Clinical Impression


Clinical Impression: 


 Chest discomfort, UTI (urinary tract infection)








- Scribe Statement


The provider has reviewed the documentation as recorded by the Scribe (Jayro Carter)


Provider Attestation: 


All medical record entries made by the Scribe were at my direction and 

personally dictated by me. I have reviewed the chart and agree that the record 

accurately reflects my personal performance of the history, physical exam, 

medical decision making, and the department course for this patient. I have also

 personally directed, reviewed, and agree with the discharge instructions and 

disposition.

## 2018-10-28 VITALS
SYSTOLIC BLOOD PRESSURE: 118 MMHG | OXYGEN SATURATION: 98 % | TEMPERATURE: 98.6 F | DIASTOLIC BLOOD PRESSURE: 55 MMHG | HEART RATE: 75 BPM | RESPIRATION RATE: 14 BRPM

## 2018-10-28 LAB
ALBUMIN SERPL-MCNC: 4 [, G/DL] (ref 3.5–5)
ALBUMIN/GLOB SERPL: 1.3 [,] (ref 1–2.1)
ALT SERPL-CCNC: 19 [, U/L] (ref 9–52)
APTT BLD: 34 [, SECONDS] (ref 21–34)
AST SERPL-CCNC: 19 [, U/L] (ref 14–36)
BACTERIA #/AREA URNS HPF: (no result) [,]
BASOPHILS # BLD AUTO: 0.1 [, K/UL] (ref 0–0.2)
BASOPHILS NFR BLD: 1.3 [, %] (ref 0–2)
BILIRUB UR-MCNC: (no result) [,]
BNP SERPL-MCNC: 15.5 [, PG/ML] (ref 0–900)
BUN SERPL-MCNC: 12 [, MG/DL] (ref 7–17)
CALCIUM SERPL-MCNC: 9.5 [, MG/DL] (ref 8.6–10.4)
EOSINOPHIL # BLD AUTO: 0.2 [, K/UL] (ref 0–0.7)
EOSINOPHIL NFR BLD: 2.6 [, %] (ref 0–4)
ERYTHROCYTE [DISTWIDTH] IN BLOOD BY AUTOMATED COUNT: 13.9 [, %] (ref 11.5–14.5)
GFR NON-AFRICAN AMERICAN: 52 [,]
GLUCOSE UR STRIP-MCNC: NORMAL [, MG/DL]
HCG,QUALITATIVE URINE: NEGATIVE [,]
HGB BLD-MCNC: 12.1 [, G/DL] (ref 11–16)
INR PPP: 1.1 [,]
LEUKOCYTE ESTERASE UR-ACNC: (no result) [, LEU/UL]
LIPASE: 166 [, U/L] (ref 23–300)
LYMPHOCYTES # BLD AUTO: 1.7 [, K/UL] (ref 1–4.3)
LYMPHOCYTES NFR BLD AUTO: 22.9 [, %] (ref 20–40)
MCH RBC QN AUTO: 26.5 [, PG] (ref 27–31)
MCHC RBC AUTO-ENTMCNC: 32.9 [, G/DL] (ref 33–37)
MCV RBC AUTO: 80.6 [, FL] (ref 81–99)
MONOCYTES # BLD: 0.6 [, K/UL] (ref 0–0.8)
MONOCYTES NFR BLD: 7.9 [, %] (ref 0–10)
NEUTROPHILS # BLD: 4.9 [, K/UL] (ref 1.8–7)
NEUTROPHILS NFR BLD AUTO: 65.3 [, %] (ref 50–75)
NRBC BLD AUTO-RTO: 0 [, %] (ref 0–2)
PH UR STRIP: 5 [,] (ref 5–8)
PLATELET # BLD: 201 [, K/UL] (ref 130–400)
PMV BLD AUTO: 10.6 [, FL] (ref 7.2–11.7)
PROT UR STRIP-MCNC: (no result) [, MG/DL]
PROTHROMBIN TIME: 12.1 [, SECONDS] (ref 9.7–12.2)
RBC # BLD AUTO: 4.56 [, MIL/UL] (ref 3.8–5.2)
RBC # UR STRIP: NEGATIVE [,]
SP GR UR STRIP: 1.03 [,] (ref 1–1.03)
SQUAMOUS EPITHIAL: 5 [, /HPF] (ref 0–5)
UROBILINOGEN UR-MCNC: 4 [, MG/DL] (ref 0.2–1)
WBC # BLD AUTO: 7.5 [, K/UL] (ref 4.8–10.8)

## 2018-10-28 NOTE — RAD
Date of service: 



10/28/2018



PROCEDURE:  CHEST RADIOGRAPH, 1 VIEW



HISTORY:

chest pain



COMPARISON:

Chest radiograph 07/27/2017.



FINDINGS:



LUNGS:

No interval pulmonary disease appreciated bilaterally.



PLEURA:

No pneumothorax or pleural fluid seen.



CARDIOVASCULAR:

Normal.



OSSEOUS STRUCTURES:

No significant abnormalities.



VISUALIZED UPPER ABDOMEN:

Normal.



OTHER FINDINGS:

None. 



IMPRESSION:

No interval acute cardiopulmonary disease appreciated.

## 2018-10-29 NOTE — CARD
--------------- APPROVED REPORT --------------





Date of service: 10/27/2018



EKG Measurement

Heart [id number]

[id number]

[id number]

[id number]

[id number]



<Conclusion>

Normal sinus rhythm

Normal ECG

## 2018-12-07 ENCOUNTER — HOSPITAL ENCOUNTER (EMERGENCY)
Dept: HOSPITAL 31 - C.ER | Age: 53
Discharge: HOME | End: 2018-12-07
Payer: COMMERCIAL

## 2018-12-07 VITALS
HEART RATE: 75 BPM | TEMPERATURE: 98.4 F | OXYGEN SATURATION: 100 % | SYSTOLIC BLOOD PRESSURE: 138 MMHG | DIASTOLIC BLOOD PRESSURE: 84 MMHG | RESPIRATION RATE: 16 BRPM

## 2018-12-07 DIAGNOSIS — M62.838: Primary | ICD-10-CM

## 2018-12-07 PROCEDURE — 96372 THER/PROPH/DIAG INJ SC/IM: CPT

## 2018-12-07 PROCEDURE — 99283 EMERGENCY DEPT VISIT LOW MDM: CPT

## 2018-12-07 NOTE — C.PDOC
History Of Present Illness


53 year old female presents to the ED for evaluation of right-sided upper back 

pain which began around 4-5 days ago. Patient denies trauma/injury to the area, 

recent falls, or extremity numbness/weakness. 


Time Seen by Provider: 18 16:26


Chief Complaint (Nursing): Upper Extremity Problem/Injury


History Per: Patient


History/Exam Limitations: no limitations


Onset/Duration Of Symptoms: Days (4-5)


Current Symptoms Are (Timing): Still Present


Additional History Per: Patient





Past Medical History


Reviewed: Historical Data, Nursing Documentation, Vital Signs


Vital Signs: 





                                Last Vital Signs











Temp  98.4 F   18 16:13


 


Pulse  75   18 16:13


 


Resp  16   18 16:13


 


BP  138/84   18 16:13


 


Pulse Ox  100   18 16:13














- Medical History


PMH: Depression (sometimes), Gastritis, HTN, Hypercholesterolemia, Migraine


   Denies: Diabetes, Hepatitis, HIV, Chronic Kidney Disease, Seizures, Sexually 

Transmitted Disease


Surgical History: 


Family History: States: Unknown Family Hx





- Social History


Hx Tobacco Use: No


Hx Alcohol Use: Yes (social)


Hx Substance Use: No





- Immunization History


Hx Tetanus Toxoid Vaccination: Yes


Hx Influenza Vaccination: No


Hx Pneumococcal Vaccination: No





Review Of Systems


Musculoskeletal: Positive for: Back Pain (right-sided, upper )


Neurological: Negative for: Weakness, Numbness





Physical Exam





- Physical Exam


Appears: Non-toxic, No Acute Distress


Skin: Normal Color, Warm, Dry


Neck: Normal ROM, Supple


Chest: Symmetrical, No Deformity


Back: Muscle Spasm, Other (right-sided trapezius tenderness)


Extremity: Normal ROM


Neurological/Psych: Oriented x3, Normal Speech, Normal Cognition


Gait: Steady





ED Course And Treatment


O2 Sat by Pulse Oximetry: 100 (on RA)


Pulse Ox Interpretation: Normal





Medical Decision Making


Medical Decision Making: 





Impression: 53 year old female with right upper back pain 


Plan: 


* Toradol IM 


* reassess and disposition 





Progress:


Toradol IM given.





On reassessment, patient is resting comfortably, showing no signs of distress 

and reports an improvement in her back pain. Patient is ambulatory in the ED 

with a steady gait and is stable for discharge. She is advised to follow up with

her PMD within 1-2 days for further evaluation. Advised to return to the ED if 

symptoms persist or worsen. 





Disposition


Counseled Patient/Family Regarding: Diagnosis, Need For Followup, Rx Given





- Disposition


Referrals: 


Prachi Anand MD [Medical Doctor] - 


Disposition: HOME/ ROUTINE


Disposition Time: 17:05


Condition: GOOD


Prescriptions: 


Cyclobenzaprine [Cyclobenzaprine HCl] 10 mg PO TID #30 tab


Ibuprofen [Motrin] 600 mg PO Q8 #30 tab


Instructions:  Muscle Spasms (DC)


Print Language: Chadian





- POA


Present On Arrival: None





- Clinical Impression


Clinical Impression: 


 Trapezius muscle spasm








- PA / NP / Resident Statement


MD/DO has reviewed & agrees with the documentation as recorded.





- Scribe Statement


The provider has reviewed the documentation as recorded by the Scribe (Maryan Zaragoza)








All medical record entries made by the Scribe were at my direction and 

personally dictated by me. I have reviewed the chart and agree that the record 

accurately reflects my personal performance of the history, physical exam, 

medical decision making, and the department course for this patient. I have also

 personally directed, reviewed, and agree with the discharge instructions and 

disposition.

## 2019-02-06 ENCOUNTER — HOSPITAL ENCOUNTER (EMERGENCY)
Dept: HOSPITAL 31 - C.ER | Age: 54
Discharge: HOME | End: 2019-02-06
Payer: COMMERCIAL

## 2019-02-06 VITALS — RESPIRATION RATE: 18 BRPM

## 2019-02-06 VITALS — DIASTOLIC BLOOD PRESSURE: 36 MMHG | SYSTOLIC BLOOD PRESSURE: 113 MMHG | HEART RATE: 62 BPM | TEMPERATURE: 98.4 F

## 2019-02-06 VITALS — OXYGEN SATURATION: 98 %

## 2019-02-06 DIAGNOSIS — E78.00: ICD-10-CM

## 2019-02-06 DIAGNOSIS — I10: ICD-10-CM

## 2019-02-06 DIAGNOSIS — R07.89: Primary | ICD-10-CM

## 2019-02-06 LAB
ALBUMIN SERPL-MCNC: 4.2 G/DL (ref 3.5–5)
ALBUMIN/GLOB SERPL: 1.3 {RATIO} (ref 1–2.1)
ALT SERPL-CCNC: 12 U/L (ref 9–52)
AST SERPL-CCNC: 34 U/L (ref 14–36)
BACTERIA #/AREA URNS HPF: (no result) /[HPF]
BASOPHILS # BLD AUTO: 0.1 K/UL (ref 0–0.2)
BASOPHILS NFR BLD: 1.1 % (ref 0–2)
BILIRUB UR-MCNC: NEGATIVE MG/DL
BUN SERPL-MCNC: 14 MG/DL (ref 7–17)
CALCIUM SERPL-MCNC: 9.6 MG/DL (ref 8.6–10.4)
CK MB SERPL-MCNC: 0.7 NG/ML (ref 0–3.38)
EOSINOPHIL # BLD AUTO: 0.1 K/UL (ref 0–0.7)
EOSINOPHIL NFR BLD: 1.4 % (ref 0–4)
ERYTHROCYTE [DISTWIDTH] IN BLOOD BY AUTOMATED COUNT: 14 % (ref 11.5–14.5)
GFR NON-AFRICAN AMERICAN: > 60
GLUCOSE UR STRIP-MCNC: NORMAL MG/DL
HGB BLD-MCNC: 12.7 G/DL (ref 11–16)
LEUKOCYTE ESTERASE UR-ACNC: (no result) LEU/UL
LIPASE: 202 U/L (ref 23–300)
LYMPHOCYTES # BLD AUTO: 1.6 K/UL (ref 1–4.3)
LYMPHOCYTES NFR BLD AUTO: 23.3 % (ref 20–40)
MCH RBC QN AUTO: 26 PG (ref 27–31)
MCHC RBC AUTO-ENTMCNC: 31.8 G/DL (ref 33–37)
MCV RBC AUTO: 81.6 FL (ref 81–99)
MONOCYTES # BLD: 0.5 K/UL (ref 0–0.8)
MONOCYTES NFR BLD: 7.6 % (ref 0–10)
NEUTROPHILS # BLD: 4.6 K/UL (ref 1.8–7)
NEUTROPHILS NFR BLD AUTO: 66.6 % (ref 50–75)
NRBC BLD AUTO-RTO: 0.1 % (ref 0–2)
PH UR STRIP: 5 [PH] (ref 5–8)
PLATELET # BLD: 208 K/UL (ref 130–400)
PMV BLD AUTO: 10.5 FL (ref 7.2–11.7)
PROT UR STRIP-MCNC: NEGATIVE MG/DL
RBC # BLD AUTO: 4.9 MIL/UL (ref 3.8–5.2)
RBC # UR STRIP: (no result) /UL
SP GR UR STRIP: 1.02 (ref 1–1.03)
SQUAMOUS EPITHIAL: 2 /HPF (ref 0–5)
UROBILINOGEN UR-MCNC: NORMAL MG/DL (ref 0.2–1)
WBC # BLD AUTO: 7 K/UL (ref 4.8–10.8)

## 2019-02-06 PROCEDURE — 93005 ELECTROCARDIOGRAM TRACING: CPT

## 2019-02-06 PROCEDURE — 96374 THER/PROPH/DIAG INJ IV PUSH: CPT

## 2019-02-06 PROCEDURE — 82553 CREATINE MB FRACTION: CPT

## 2019-02-06 PROCEDURE — 99285 EMERGENCY DEPT VISIT HI MDM: CPT

## 2019-02-06 PROCEDURE — 84703 CHORIONIC GONADOTROPIN ASSAY: CPT

## 2019-02-06 PROCEDURE — 80053 COMPREHEN METABOLIC PANEL: CPT

## 2019-02-06 PROCEDURE — 83690 ASSAY OF LIPASE: CPT

## 2019-02-06 PROCEDURE — 85025 COMPLETE CBC W/AUTO DIFF WBC: CPT

## 2019-02-06 PROCEDURE — 71046 X-RAY EXAM CHEST 2 VIEWS: CPT

## 2019-02-06 PROCEDURE — 81001 URINALYSIS AUTO W/SCOPE: CPT

## 2019-02-06 PROCEDURE — 84484 ASSAY OF TROPONIN QUANT: CPT

## 2019-02-06 NOTE — RAD
HISTORY:

 SOB 



COMPARISON:

Chest x-ray performed 10/28/18. 



TECHNIQUE:

Chest PA and lateral



FINDINGS:

Examination limited by habitus.



LUNGS:

No focal consolidation.



Please note that chest x-ray has limited sensitivity for the 

detection of pulmonary masses.



PLEURA:

No significant pleural effusion identified. No definite pneumothorax .



CARDIOVASCULAR:

Mild cardiomegaly.  Ectatic aorta.  Atherosclerotic calcifications. 



OSSEOUS STRUCTURES:

Degenerative changes.



VISUALIZED UPPER ABDOMEN:

Unremarkable.



OTHER FINDINGS:

None.



IMPRESSION:

No focal consolidation.  Mild cardiomegaly.

## 2019-02-06 NOTE — C.PDOC
History Of Present Illness


54 y/o female presents to the ED complaining of mid-sternal chest pain for 1 

day. No associated cough or SOB. Patient denies prior cardiac history or any 

known medical problems. Otherwise she denies any fevers, chills, cold sweats, b

ack pain, nausea, vomiting, or calf pain/swelling.





Time Seen by Provider: 19 12:24


Chief Complaint (Nursing): Chest Pain


History Per: Patient


History/Exam Limitations: no limitations


Onset/Duration Of Symptoms: Days (x 1)


Current Symptoms Are (Timing): Still Present


Quality: Aching


Exacerbating Factors: Movement





Past Medical History


Reviewed: Historical Data, Nursing Documentation, Vital Signs


Vital Signs: 





                                Last Vital Signs











Temp  98.2 F   19 12:06


 


Pulse  71   19 12:06


 


Resp  18   19 12:06


 


BP  112/80   19 12:06


 


Pulse Ox  98   19 12:06














- Medical History


PMH: Depression (sometimes), Gastritis, HTN, Hypercholesterolemia, Migraine


Surgical History: 


Family History: States: Unknown Family Hx





- Social History


Hx Tobacco Use: No


Hx Alcohol Use: Yes (social)


Hx Substance Use: No





- Immunization History


Hx Tetanus Toxoid Vaccination: Yes


Hx Influenza Vaccination: No


Hx Pneumococcal Vaccination: No





Review Of Systems


Cardiovascular: Positive for: Chest Pain.  Negative for: Palpitations


Respiratory: Positive for: Cough.  Negative for: Shortness of Breath


Gastrointestinal: Negative for: Nausea, Vomiting


Musculoskeletal: Negative for: Back Pain





Physical Exam





- Physical Exam


Appears: Non-toxic, No Acute Distress


Skin: Warm, Dry


Head: Atraumatic, Normacephalic


Eye(s): bilateral: Normal Inspection, PERRL, EOMI


Oral Mucosa: Moist


Neck: Normal ROM


Chest: Symmetrical, Tenderness (reproducible anterior chest wall tenderness)


Cardiovascular: Rhythm Regular, No Murmur


Respiratory: Normal Breath Sounds, No Rales, No Rhonchi, No Wheezing


Gastrointestinal/Abdominal: Soft, No Tenderness, No Distention


Extremity: Normal ROM


Extremity: Bilateral: Atraumatic, No Pedal Edema, Normal ROM


Pulses: Left Dorsalis Pedis: Normal, Right Dorsalis Pedis: Normal


Neurological/Psych: Oriented x3


Gait: Steady





ED Course And Treatment





- Laboratory Results


Result Diagrams: 


                                 19 13:51





                                 19 13:51


Lab Interpretation: Normal


ECG: Interpreted By Me


ECG Rhythm: Sinus Rhythm, R BBB


ECG Interpretation: No Acute Changes, No Changes From Prior


Rate From EC


O2 Sat by Pulse Oximetry: 98 (RA)


Pulse Ox Interpretation: Normal





- Radiology


CXR: Interpreted by Me


CXR Interpretation: Yes: No Acute Disease





- Other Rad


  ** CXR


X-Ray: Read By Radiologist


Interpretation: Accession No. : [id number].  Patient Name / ID : DHEERAJ RHODES  / [id number].  Exam Date : 2019 12:46:22 ( Approved ).  Study 

Comment :  Sex / Age : F  / 53Y.  Creator : Celina Lopez MD.  Dictator :

Celina Lopez MD.   :  Approver : Celina Lopez MD.  

Approver2 :  Report Date : 2019 13:06:59.  My Comment :  

********************************************************

***************************.  HISTORY:  SOB.  COMPARISON:  Chest x-ray performed

10/28/18.  TECHNIQUE:  Chest PA and lateral.  FINDINGS:  Examination limited by 

habitus.  LUNGS:  No focal consolidation.  Please note that chest x-ray has 

limited sensitivity for the detection of pulmonary masses.  PLEURA:  No 

significant pleural effusion identified. No definite pneumothorax .  

CARDIOVASCULAR:  Mild cardiomegaly.  Ectatic aorta.  Atherosclerotic 

calcifications.  OSSEOUS STRUCTURES:  Degenerative changes.  VISUALIZED UPPER 

ABDOMEN:  Unremarkable.  OTHER FINDINGS:  None.  IMPRESSION:  No focal cons

olidation.  Mild cardiomegaly.


Progress Note: Treated with toradol IV.  On re-evaluation lungs clear in no 

distress, No chest pain requesting to be discharge


Reassessment Condition: Improved





Medical Decision Making


Medical Decision Making: 





Plan: Cardiac work-up


Ordered labs with cardiac enzymes, EKG, and CXR. Patient given 30 mg IV Toradol 

for pain








Disposition


Counseled Patient/Family Regarding: Studies Performed, Diagnosis, Need For 

Followup, Rx Given





- Disposition


Disposition: HOME/ ROUTINE


Disposition Time: 14:50


Condition: CRITICAL


Additional Instructions: 


Follow up with your PMD for further evaluation


REeturn to ED if any increase symptoms


Prescriptions: 


Naproxen [Naprosyn] 1 tab PO BID PRN #25 tab


 PRN Reason: Pain


Instructions:  Costochondritis


Forms:  [employer] (English)





- POA


Present On Arrival: None





- Clinical Impression


Clinical Impression: 


 Chest pain, Chest wall pain








- PA / NP / Resident Statement


MD/DO has reviewed & agrees with the documentation as recorded.





- Scribe Statement


The provider has reviewed the documentation as recorded by the Scribmalka Sue





All medical record entries made by the Jeremi were at my direction and 

personally dictated by me. I have reviewed the chart and agree that the record 

accurately reflects my personal performance of the history, physical exam, 

medical decision making, and the department course for this patient. I have also

 personally directed, reviewed, and agree with the discharge instructions and 

disposition.

## 2019-02-07 NOTE — CARD
--------------- APPROVED REPORT --------------





Date of service: 02/06/2019



EKG Measurement

Heart [id number]

[id number]

[id number]

[id number]

[id number]



<Conclusion>

Normal sinus rhythm

Possible Left atrial enlargement

Incomplete right bundle branch block

Borderline ECG